# Patient Record
Sex: FEMALE | Race: OTHER | Employment: FULL TIME | URBAN - METROPOLITAN AREA
[De-identification: names, ages, dates, MRNs, and addresses within clinical notes are randomized per-mention and may not be internally consistent; named-entity substitution may affect disease eponyms.]

---

## 2018-06-26 ENCOUNTER — HOSPITAL ENCOUNTER (EMERGENCY)
Age: 28
Discharge: HOME | End: 2018-06-26
Attending: EMERGENCY MEDICINE
Payer: COMMERCIAL

## 2018-06-26 VITALS
SYSTOLIC BLOOD PRESSURE: 120 MMHG | TEMPERATURE: 97.3 F | HEART RATE: 90 BPM | DIASTOLIC BLOOD PRESSURE: 64 MMHG | WEIGHT: 143.52 LBS | RESPIRATION RATE: 15 BRPM | OXYGEN SATURATION: 99 %

## 2018-06-26 DIAGNOSIS — R09.82 POST-NASAL DRIP: ICD-10-CM

## 2018-06-26 DIAGNOSIS — Z34.92 PREGNANT AND NOT YET DELIVERED IN SECOND TRIMESTER: ICD-10-CM

## 2018-06-26 DIAGNOSIS — J30.9 ALLERGIC RHINITIS, UNSPECIFIED SEASONALITY, UNSPECIFIED TRIGGER: Primary | ICD-10-CM

## 2018-06-26 DIAGNOSIS — H92.01 RIGHT EAR PAIN: ICD-10-CM

## 2018-06-26 LAB
APPEARANCE UR: CLEAR
BACTERIA URNS QL MICRO: NEGATIVE /HPF
BILIRUB UR QL: NEGATIVE
COLOR UR: ABNORMAL
EPITH CASTS URNS QL MICRO: ABNORMAL /LPF
GLUCOSE UR STRIP.AUTO-MCNC: NEGATIVE MG/DL
HGB UR QL STRIP: NEGATIVE
HYALINE CASTS URNS QL MICRO: ABNORMAL /LPF (ref 0–5)
KETONES UR QL STRIP.AUTO: NEGATIVE MG/DL
LEUKOCYTE ESTERASE UR QL STRIP.AUTO: ABNORMAL
NITRITE UR QL STRIP.AUTO: NEGATIVE
PH UR STRIP: 6.5 [PH] (ref 5–8)
PROT UR STRIP-MCNC: NEGATIVE MG/DL
RBC #/AREA URNS HPF: ABNORMAL /HPF (ref 0–5)
SP GR UR REFRACTOMETRY: 1.01 (ref 1–1.03)
UR CULT HOLD, URHOLD: NORMAL
UROBILINOGEN UR QL STRIP.AUTO: 0.2 EU/DL (ref 0.2–1)
WBC URNS QL MICRO: ABNORMAL /HPF (ref 0–4)

## 2018-06-26 PROCEDURE — 99284 EMERGENCY DEPT VISIT MOD MDM: CPT

## 2018-06-26 PROCEDURE — 81001 URINALYSIS AUTO W/SCOPE: CPT | Performed by: PHYSICIAN ASSISTANT

## 2018-06-26 PROCEDURE — 87086 URINE CULTURE/COLONY COUNT: CPT | Performed by: PHYSICIAN ASSISTANT

## 2018-06-26 RX ORDER — CETIRIZINE HCL 10 MG
10 TABLET ORAL DAILY
Qty: 20 TAB | Refills: 0 | Status: SHIPPED | OUTPATIENT
Start: 2018-06-26

## 2018-06-26 NOTE — DISCHARGE INSTRUCTIONS
Alergias: Instrucciones de cuidado - [ Allergies: Care Instructions ]  Instrucciones de cuidado    Las alergias ocurren cuando el sistema de defensa del organismo (sistema inmunitario) reacciona de manera excesiva ante ciertas sustancias. El sistema inmunitario trata a cornelio sustancia inofensiva lukas si fuera un microbio perjudicial o un virus. Existen muchas sustancias que pueden provocar esta reacción excesiva, incluidos el polen, los medicamentos, los alimentos, el polvo, la caspa de los [place] y el moho. Las [employer] pueden ser leves o graves. Las alergias leves pueden manejarse en el hogar. Sin embargo, es posible que necesite daina medicamentos para prevenir problemas. Manejar las alergias es cornelio parte importante del estar saludable. Dumas médico podría sugerirle que se ban cornelio prueba de [place] para ayudar a encontrar la causa de las [employer]. Sukhwinder Geovani que sepa cuáles son las cosas que [place] síntomas, puede evitarlas. [place] puede prevenir los síntomas de [place] y [de-identified] de [facility]. Para las [employer] graves que provocan reacciones que afectan a todo dumas organismo (reacciones anafilácticas), dumas médico podría recetarle cornelio inyección de epinefrina para que la lleve con usted en valentine de [place] reacción grave. Aprenda a aplicarse la dosis usted mismo y llévela consigo todo el [place]. Asegúrese de que no haya caducado. La atención de seguimiento es cornelio parte clave de dumas tratamiento y seguridad. Asegúrese de hacer y acudir a todas las citas, y llame a dumas médico si está teniendo problemas. También es cornelio buena idea saber los resultados de los exámenes y mantener cornelio lista de los medicamentos que haylie. ¿Cómo puede cuidarse en el hogar? · Si dumas médico le ha [employer] ácaros del polvo o el polvo es lo que causa dumas alergia, reduzca la cantidad de polvo alrededor de dumas cama:  ¨ Lave las sábanas, las fundas de las almohadas y demás ropa de cama con [place] todas las semanas.   Marychuy Roberts fundas para almohadas, edredones y colchones a prueba de polvo. Evite las fundas de plástico, ya que tienden a romperse fácilmente y no \"respiran\". Lave la ropa de cama siguiendo las instrucciones de la etiqueta. ¨ No use mantas ni almohadas que no necesite. ¨ Use mantas que pueda raegan en la lavadora. [address] campbell y las alfombras de dumas habitación, ya que atraen y acumulan polvo. · Si usted es alérgico al polvo del hogar y a los [place], no use humidificadores. El médico puede sugerirle maneras de controlar el polvo y [place]. · Busque rastros de cucarachas. Las cucarachas provocan reacciones alérgicas. Use cebos para cucarachas para eliminarlas. Luego, limpie jess dumas casa. A las cucarachas les [employer] lugares donde se almacenan bolsas del machado, periódicos, botellas vacías o cory de cartón. No guarde estos objetos dentro de la casa, y mantenga el contenedor de basura y los recipientes de alimentos jess cerrados. Selle todos los lugares por donde las cucarachas puedan ingresar a dumas hogar. · Si usted es alérgico al moho, deshágase de muebles, tapetes y campbell que huelan a moho. Revise que no haya moho en el baño. · Si usted es alérgico al polen del exterior o a las esporas de moho, use el aire acondicionado. Cambie o limpie todos los filtros cornelio vez al [facility]. [facility]. · Si usted es alérgico al polen, no salga cuando la concentración de polen sea tania. Use cornelio aspiradora con filtro HEPA o filtro de doble espesor al [employer] a la [place]. · No salga cuando la contaminación del aire sea tania. Evite los vapores de la pintura, los perfumes y otros olores hedy. · Evite todo lo que pueda empeorar alexus alergias. Manténgase alejado del humo. No fume ni deje que otras personas lo keke en dumas casa. No use chimeneas ni estufas de leña. · Si usted es alérgico a alexus mascotas, cambie el filtro de aire de la calefacción todos los [place].  Use filtros de alto rendimiento. · Si usted es alérgico a la caspa de los [place], no deje que las mascotas entren a la casa o manténgalas fuera de dumas habitación. Las alfombras Ruby y los muebles tapizados con lubna pueden albergar gran cantidad de caspa de animales. Carmine vez tenga que reemplazarlos. ¿Cuándo debe pedir ayuda? Aplíquese cornelio inyección de epinefrina si:  ? · Piensa que está teniendo Marcha Marcelino reacción alérgica grave. ? · Tiene síntomas en más de cornelio leroy del cuerpo, lukas náuseas leves y comezón en la boca. ? Después de aplicarse cornelio inyección de epinefrina, llame al 911 incluso si se siente mejor. ?Llame al 911 si:  ? · Tiene síntomas de cornelio reacción alérgica grave. Estos pueden incluir:  ¨ Zonas abultadas y enrojecidas (ronchas) que aparecen repentinamente por todo el cuerpo. ¨ Hinchazón de la garganta, la boca, los labios o la [place]. ¨ Dificultad para respirar. ¨ Pérdida del conocimiento (desmayo). O podría sentirse muy aturdido o de repente sentirse débil, confuso o agitado. ? · Le kelley aplicado cornelio inyección de epinefrina, incluso si se siente mejor. ?Llame a dumas médico ahora mismo o busque atención médica inmediata si:  ? · Tiene síntomas de cornelio reacción alérgica, tales lukas:  ¨ Salpullido o ronchas (zonas abultadas y enrojecidas en la piel). ¨ Comezón. ¨ Hinchazón. ¨ Dolor abdominal, náuseas o vómito. ?Preste especial atención a los cambios en dumas riky y asegúrese de comunicarse con dumas médico si:  ? · No mejora lukas se esperaba. ¿Dónde puede encontrar más información en inglés? Gasper Rowe a http://silvestre-pepito.info/. Werner Moreno E615 en la búsqueda para aprender más acerca de \"Alergias: Instrucciones de cuidado - [ Allergies: Care Instructions ]. \"  Revisado: 29 septiembre, 2016  Versión del contenido: 11.4  © 8973-9049 Healthwise, [employer]. Las instrucciones de cuidado fueron adaptadas bajo licencia por [employer] (which disclaims liability or warranty for this information). Si usted tiene [place] afección médica o sobre estas instrucciones, siempre pregunte a dumas profesional de riky. [employer] niega toda garantía o responsabilidad por dumas uso de esta información. Dolor de oído: Instrucciones de cuidado - [ Earache: Care Instructions ]  Instrucciones de cuidado    Aunque las infecciones del oído son Gwen Keviney causa común de dolor de oído, no todos los maicol de oído indican cornelio infección. Si usted tiene dolor de oído y no tiene Gwen Cookey infección, podría deberse a un problema de la mandíbula, lukas el dolor de la articulación temporomandibular (TMJ, por alexus siglas en inglés). O podría ser por un problema en el francy. Cuando la molestia o el dolor son leves o aparecen y desaparecen sin otro síntoma, es posible que todo lo que necesite sea tratamiento en el hogar. La atención de seguimiento es cornelio parte clave de dumas tratamiento y seguridad. Asegúrese de hacer y acudir a todas las citas, y llame a dumas médico si está teniendo problemas. También es cornelio buena idea saber los resultados de los exámenes y mantener cornelio lista de los medicamentos que haylie. ¿Cómo puede cuidarse en el [facility]? · Aplíquese calor en el oído para aliviar el dolor. Para aplicarse calor, póngase cornelio bolsa de agua tibia, cornelio almohadilla térmica ajustada a baja temperatura o un paño tibio sobre la [place]. No se vaya a dormir con cornelio almohadilla térmica sobre la piel. · [place] un analgésico (medicamento para el dolor) de venta ranjith, lukas acetaminofén (Tylenol), ibuprofeno (Advil, Motrin) o naproxeno (Aleve). Tenga cuidado con los medicamentos. Isabel y siga todas las instrucciones de la [place]. · No tome dos o más analgésicos al mismo tiempo a menos que el médico se lo haya indicado. Muchos analgésicos contienen acetaminofén, lo cual es Tylenol. El exceso de acetaminofén (Tylenol) puede ser dañino.   · Nunca se introduzca nada en el oído, lukas, por ejemplo, un hisopo de algodón o un prendedor (pasador) de pelo. ¿Cuándo debe pedir ayuda? Llame a dumas médico ahora mismo o busque atención médica inmediata si:  ? · Tiene nuevos o peores síntomas de infección, tales lukas:  ¨ Aumento del dolor, la hinchazón, la temperatura o el enrojecimiento. ¨ Vetas rojizas que salen de la leroy. ¨ Pus que sale de la leroy. Donny Levi. ?Preste especial atención a los cambios en dumas riky y asegúrese de comunicarse con dumas médico si:  ? · Tiene secreción nueva o peor que sale del oído. ? · No mejora lukas se esperaba. ¿Dónde puede encontrar más información en inglés? Calderon Falcon a http://silvestre-pepito.info/. Shane London O115 en la búsqueda para aprender más acerca de \"Dolor de oído: Instrucciones de cuidado - [ Earache: Care Instructions ]. \"  Revisado: 12 rolle, 2017  Versión del contenido: 11.4  © 7183-7974 Healthwise, Incorporated. Las instrucciones de cuidado fueron adaptadas bajo licencia por [employer] (which disclaims liability or warranty for this information). Si usted tiene [place] afección médica o sobre estas instrucciones, siempre pregunte a dumas profesional de riky. Healthwise, Incorporated niega toda garantía o responsabilidad por dumas uso de esta información. We hope that we have addressed all of your medical concerns. The examination and treatment you received in the Emergency Department were for an emergent problem and were not intended as complete care. It is important that you follow up with your healthcare provider(s) for ongoing care. If your symptoms worsen or do not improve as expected, and you are unable to reach your usual health care provider(s), you should return to the Emergency Department. Today's healthcare is undergoing tremendous change, and patient satisfaction surveys are one of the many tools to assess the quality of medical care. You may receive a survey from the [employer] organization regarding your experience in the Emergency Department.   I hope that your experience has been completely positive, particularly the medical care that I provided. As such, please participate in the survey; anything less than excellent does not meet my expectations or intentions. [facility] and [facility] participate in nationally recognized quality of care measures. If your blood pressure is greater than 120/80, as reported below, we urge that you seek medical care to address the potential of high blood pressure, commonly known as hypertension. Hypertension can be hereditary or can be caused by certain medical conditions, pain, stress, or \"white coat syndrome. \"       Please make an appointment with your health care provider(s) for follow up of your Emergency Department visit. VITALS:   Patient Vitals for the past 8 hrs:   Temp Pulse Resp BP SpO2   06/26/18 1248 97.3 °F (36.3 °C) 90 15 120/64 99 %          Thank you for allowing us to provide you with medical care today. We realize that you have many choices for your emergency care needs. Please choose us in the future for any continued health care needs. Jefferson Coto, [facility]: [phone]            Recent Results (from the past 24 hour(s))   URINALYSIS W/MICROSCOPIC    Collection Time: 06/26/18  1:18 PM   Result Value Ref Range    Color YELLOW/STRAW      Appearance CLEAR CLEAR      Specific gravity 1.006 1.003 - 1.030      pH (UA) 6.5 5.0 - 8.0      Protein NEGATIVE  NEG mg/dL    Glucose NEGATIVE  NEG mg/dL    Ketone NEGATIVE  NEG mg/dL    Bilirubin NEGATIVE  NEG      Blood NEGATIVE  NEG      Urobilinogen 0.2 0.2 - 1.0 EU/dL    Nitrites NEGATIVE  NEG      Leukocyte Esterase SMALL (A) NEG      WBC 5-10 0 - 4 /hpf    RBC 0-5 0 - 5 /hpf    Epithelial cells FEW FEW /lpf    Bacteria NEGATIVE  NEG /hpf    Hyaline cast 0-2 0 - 5 /lpf   URINE CULTURE HOLD SAMPLE    Collection Time: 06/26/18  1:18 PM   Result Value Ref Range    Urine culture hold        URINE ON HOLD IN MICROBIOLOGY DEPT FOR 3 DAYS. IF UNPRESERVED URINE IS SUBMITTED, IT CANNOT BE USED FOR ADDITIONAL TESTING AFTER 24 HRS, RECOLLECTION WILL BE REQUIRED. No results found. Lavados nasales salinos: Instrucciones de cuidado - [ Saline Nasal Washes: Care Instructions ]  Instrucciones de cuidado  Los lavados nasales salinos ayudan a mantener las fosas nasales abiertas al eliminar la mucosidad espesa o seca. Mary sencillo remedio puede ayudar a [employer] síntomas de [place], sinusitis y resfriados. También puede hacer que la nariz se sienta más cómoda al [employer] las membranas mucosas húmedas. Es posible que note cornelio ligera sensación de ardor en la nariz las primeras veces que use la solución, dakota esto por lo general mejora en unos cuantos días. La atención de seguimiento es cornelio parte clave de dumas tratamiento y seguridad. Asegúrese de hacer y acudir a todas las citas, y llame a dumas médico si está teniendo problemas. También es cornelio buena idea saber los resultados de los exámenes y mantener cornelio lista de los medicamentos que haylie. ¿Cómo puede cuidarse en el hogar? · Puede comprar en la farmacia cornelio solución salina lista para usar en cornelio botella de apretar u otros productos de lavado nasal salino. Isabel y siga las instrucciones de la etiqueta. · También puede preparar dumas propia solución salina agregándole 1 cucharadita de sal y 1 cucharadita de bicarbonato de sodio a 2 tazas de agua destilada. · Si [place] cornelio solución hecha en casa, eche un poco en un tazón limpio. Utilizando cornelio preethi de goma, comprima la preethi e introduzca la boquilla en el agua salada. Recoja cornelio pequeña cantidad de agua salada en la preethi aflojando la mano. · Siéntese con la schuyler inclinada un poco hacia atrás. No se recueste del todo. Introduzca un poco la boquilla de la preethi de goma o de la botella de apretar en cornelio de las fosas nasales.  Eche suavemente unas cuantas gotas o un pequeño chorro en cornelio de las fosas nasales. Repita la operación en la otra fosa nasal. Es normal tener unos cuantos estornudos y arcadas al principio. · Suénese la nariz con cuidado. · Limpie la preethi de goma o la boquilla de la botella después de [address]. · Soila esto 2 o 3 veces al día. · Hágase el lavado nasal con cuidado si le sangra la nariz con frecuencia. ¿Cuándo debe pedir ayuda? Preste especial atención a los cambios en dumas riky y asegúrese de comunicarse con dumas médico si:  ? · Tiene hemorragias nasales frecuentes. ? · Tiene problemas para hacerse los lavados nasales. ¿Dónde puede encontrar más información en inglés? John Dominique a http://silvestre-pepito.info/. Escriba B784 en la búsqueda para aprender más acerca de \"Lavados nasales salinos: Instrucciones de cuidado - [ Saline Nasal Washes: Care Instructions ]. \"  Revisado: 12 [place], 2017  Versión del contenido: 11.4  © 5047-2600 Healthwise, Incorporated. Las instrucciones de cuidado fueron adaptadas bajo licencia por [employer] (which disclaims liability or warranty for this information). Si usted tiene [place] afección médica o sobre estas instrucciones, siempre pregunte a dumas profesional de riky. Healthwise, Incorporated niega toda garantía o responsabilidad por dumas uso de esta información.

## 2018-06-26 NOTE — ED PROVIDER NOTES
HPI Comments: Mirna Juarez is a 32 y.o. female who presents ambulatory to the ED with a c/o cold sx x 4 days with sore throat, worse at night, right ear ache, non productive cough, and nasal congestion. She denies f/c or tx pta. Pt is 6 mo pregnant and followed at [facility]. She states her last prenatal visit a few weeks ago was without incident and the baby is doing well. She notes when she coughs, she feels lower pelvic pressure. Pt denies dysuria, hematuria or vaginal bleeding. Pt notes no abd discomfort otherwise and states the baby is active. She denies cp, sob or n/v/d. Pt is currently taking macrobid for uti that she filled 6/18/18    PCP: None  PMHx significant for: No past medical history on file. PSHx significant for: No past surgical history on file. Social Hx: Tobacco: denies. EtOH: denies  Illicit drug use: denies     There are no further complaints or symptoms at this time. The history is provided by the patient. No past medical history on file. No past surgical history on file. No family history on file. Social History     Social History    Marital status:      Spouse name: N/A    Number of children: N/A    Years of education: N/A     Occupational History    Not on file. Social History Main Topics    Smoking status: Not on file    Smokeless tobacco: Not on file    Alcohol use Not on file    Drug use: Not on file    Sexual activity: Not on file     Other Topics Concern    Not on file     Social History Narrative         ALLERGIES: Review of patient's allergies indicates no known allergies. Review of Systems   Constitutional: Negative for chills and fever. HENT: Positive for congestion, ear pain, postnasal drip, rhinorrhea and sore throat. Negative for sneezing. Eyes: Negative for redness and visual disturbance. Respiratory: Positive for cough. Negative for shortness of breath. Cardiovascular: Negative for chest pain and leg swelling. Gastrointestinal: Negative for abdominal pain, nausea and vomiting. Genitourinary: Negative for difficulty urinating and frequency. Musculoskeletal: Negative for back pain, myalgias and neck stiffness. Skin: Negative for rash. Neurological: Negative for dizziness, syncope, weakness and headaches. Hematological: Negative for adenopathy. Vitals:    06/26/18 1248   BP: 120/64   Pulse: 90   Resp: 15   Temp: 97.3 °F (36.3 °C)   SpO2: 99%   Weight: 65.1 kg (143 lb 8.3 oz)            Physical Exam   Constitutional: She is oriented to person, place, and time. She appears well-developed and well-nourished. No distress. HENT:   Head: Normocephalic and atraumatic. Right Ear: External ear normal.   Left Ear: External ear normal.   + pale boggy nares with clear rhinorrhea. + PND. tms pearly bilaterally without erythema, bulging or drainage. Oropharynx without erythema, exudate or swelling. Uvula midline. No trismus. No difficulty handling secretions or speaking. No sublingual swelling   Eyes: EOM are normal. Pupils are equal, round, and reactive to light. Neck: Neck supple. Cardiovascular: Normal rate, regular rhythm, normal heart sounds and intact distal pulses. Exam reveals no gallop and no friction rub. No murmur heard. Pulmonary/Chest: Effort normal and breath sounds normal. No stridor. No respiratory distress. She has no wheezes. She has no rales. She exhibits no tenderness. Abdominal: Soft. Bowel sounds are normal. She exhibits no distension and no mass. There is no tenderness. There is no rebound and no guarding. Gravid uterus above the umbilicus   Musculoskeletal: Normal range of motion. She exhibits no edema, tenderness or deformity. Neurological: She is alert and oriented to person, place, and time. No cranial nerve deficit. Coordination normal.   Skin: No rash noted. No erythema. No pallor. Psychiatric: She has a normal mood and affect.  Her behavior is normal.   Nursing note and vitals reviewed. MDM  Number of Diagnoses or Management Options  Allergic rhinitis, unspecified seasonality, unspecified trigger:   Post-nasal drip:   Pregnant and not yet delivered in second trimester:   Right ear pain:      Amount and/or Complexity of Data Reviewed  Clinical lab tests: reviewed and ordered  Tests in the medicine section of CPT®: ordered and reviewed  Review and summarize past medical records: yes  Independent visualization of images, tracings, or specimens: yes    Patient Progress  Patient progress: stable        ED Course       Procedures  12:58 PM  Discussed pt, sx, hx and current findings with Ramila Hernandez MD. She is in agreement with plan. Will check urine and FHT. Vivian Aguilar. CALLY Coto      1:47 PM   Urine with small leuk and 5-10 wbc without bacteria. Pt asymptomatic, but 6 mo preg. Will send for culture. FHT strong/ regular  Vivian Aguilar. CALLY Coto    LABORATORY TESTS:  Recent Results (from the past 12 hour(s))   URINALYSIS W/MICROSCOPIC    Collection Time: 06/26/18  1:18 PM   Result Value Ref Range    Color YELLOW/STRAW      Appearance CLEAR CLEAR      Specific gravity 1.006 1.003 - 1.030      pH (UA) 6.5 5.0 - 8.0      Protein NEGATIVE  NEG mg/dL    Glucose NEGATIVE  NEG mg/dL    Ketone NEGATIVE  NEG mg/dL    Bilirubin NEGATIVE  NEG      Blood NEGATIVE  NEG      Urobilinogen 0.2 0.2 - 1.0 EU/dL    Nitrites NEGATIVE  NEG      Leukocyte Esterase SMALL (A) NEG      WBC 5-10 0 - 4 /hpf    RBC 0-5 0 - 5 /hpf    Epithelial cells FEW FEW /lpf    Bacteria NEGATIVE  NEG /hpf    Hyaline cast 0-2 0 - 5 /lpf   URINE CULTURE HOLD SAMPLE    Collection Time: 06/26/18  1:18 PM   Result Value Ref Range    Urine culture hold        URINE ON HOLD IN MICROBIOLOGY DEPT FOR 3 DAYS. IF UNPRESERVED URINE IS SUBMITTED, IT CANNOT BE USED FOR ADDITIONAL TESTING AFTER 24 HRS, RECOLLECTION WILL BE REQUIRED. IMAGING RESULTS:    No results found.     MEDICATIONS GIVEN:  Medications - No data to display    IMPRESSION:  1. Allergic rhinitis, unspecified seasonality, unspecified trigger    2. Right ear pain    3. Post-nasal drip    4. Pregnant and not yet delivered in second trimester        PLAN:  1. Discharge Medication List as of 6/26/2018  1:47 PM      START taking these medications    Details   cetirizine (ZYRTEC) 10 mg tablet Take 1 Tab by mouth daily. , Print, Disp-20 Tab, R-0           2. Follow-up Information     Follow up With Details Comments Contact Info    [facility] DEPARTMENT OF OB/GYN Schedule an appointment as soon as possible for a visit 2-4 days for recheck [address] [address]  [phone]        Return to ED if worse       1:48 PM  Pt has been reexamined. Pt has no new complaints, changes or physical findings. Care plan outlined and precautions discussed. All available results were reviewed with pt. All medications were reviewed with pt. All of pt's questions and concerns were addressed. Pt agrees to F/U as instructed and agrees to return to ED upon further deterioration. Pt is ready to go home.   KENISHA Chapman

## 2018-06-26 NOTE — LETTER
[address] 
[facility] EMERGENCY DEPT 
[address] [facility] [address] [id number] 
[phone] Work/School Note Date: 6/26/2018 To Whom It May concern: 
 
Mirna Juarez was seen and treated today in the emergency room by the following provider(s): 
Attending Provider: Soraida Castellon MD 
Physician Assistant: KENISHA Hightower. Mirna Juarez may return to work on 6/28/18.  
 
Sincerely, 
 
 
 
 
KENISHA Hightower

## 2018-06-26 NOTE — ED TRIAGE NOTES
Patient with sore throat and right ear pain for 4 days. Pt is ~ 6 months pregnant, denies pregnancy complications.

## 2018-06-27 LAB
BACTERIA SPEC CULT: ABNORMAL
CC UR VC: ABNORMAL
SERVICE CMNT-IMP: ABNORMAL

## 2019-09-17 ENCOUNTER — HOSPITAL ENCOUNTER (OUTPATIENT)
Dept: LAB | Age: 29
Discharge: HOME | End: 2019-09-17

## 2019-09-17 PROCEDURE — 87491 CHLMYD TRACH DNA AMP PROBE: CPT

## 2019-09-18 LAB
C TRACH DNA SPEC QL NAA+PROBE: NEGATIVE
N GONORRHOEA DNA SPEC QL NAA+PROBE: NEGATIVE
SAMPLE TYPE: NORMAL
SERVICE CMNT-IMP: NORMAL
SPECIMEN SOURCE: NORMAL

## 2023-05-18 ENCOUNTER — OFFICE VISIT (OUTPATIENT)
Age: 33
End: 2023-05-18

## 2023-05-18 VITALS
BODY MASS INDEX: 26.62 KG/M2 | HEIGHT: 60 IN | DIASTOLIC BLOOD PRESSURE: 74 MMHG | SYSTOLIC BLOOD PRESSURE: 115 MMHG | HEART RATE: 91 BPM | OXYGEN SATURATION: 97 % | WEIGHT: 135.6 LBS | RESPIRATION RATE: 16 BRPM | TEMPERATURE: 97.5 F

## 2023-05-18 DIAGNOSIS — E55.9 VITAMIN D DEFICIENCY: ICD-10-CM

## 2023-05-18 DIAGNOSIS — E78.2 MIXED HYPERLIPIDEMIA: ICD-10-CM

## 2023-05-18 DIAGNOSIS — K21.9 GASTROESOPHAGEAL REFLUX DISEASE WITHOUT ESOPHAGITIS: ICD-10-CM

## 2023-05-18 DIAGNOSIS — N30.00 ACUTE CYSTITIS WITHOUT HEMATURIA: Primary | ICD-10-CM

## 2023-05-18 LAB
BILIRUBIN, URINE, POC: NEGATIVE
BLOOD URINE, POC: NORMAL
GLUCOSE URINE, POC: NEGATIVE
KETONES, URINE, POC: NEGATIVE
LEUKOCYTE ESTERASE, URINE, POC: NORMAL
NITRITE, URINE, POC: NEGATIVE
PH, URINE, POC: 6 (ref 4.6–8)
PROTEIN,URINE, POC: NEGATIVE
SPECIFIC GRAVITY, URINE, POC: 1 (ref 1–1.03)
URINALYSIS CLARITY, POC: CLEAR
URINALYSIS COLOR, POC: YELLOW
UROBILINOGEN, POC: NORMAL

## 2023-05-18 PROCEDURE — 99204 OFFICE O/P NEW MOD 45 MIN: CPT | Performed by: NURSE PRACTITIONER

## 2023-05-18 PROCEDURE — 81003 URINALYSIS AUTO W/O SCOPE: CPT | Performed by: NURSE PRACTITIONER

## 2023-05-18 RX ORDER — PANTOPRAZOLE SODIUM 40 MG/1
40 TABLET, DELAYED RELEASE ORAL DAILY
COMMUNITY

## 2023-05-18 RX ORDER — NITROFURANTOIN 25; 75 MG/1; MG/1
100 CAPSULE ORAL 2 TIMES DAILY
Qty: 14 CAPSULE | Refills: 0 | Status: SHIPPED | OUTPATIENT
Start: 2023-05-18 | End: 2023-05-25

## 2023-05-18 RX ORDER — DM/P-EPHED/ACETAMINOPH/DOXYLAM
LIQUID (ML) ORAL
COMMUNITY
Start: 2023-04-25

## 2023-05-18 SDOH — ECONOMIC STABILITY: INCOME INSECURITY: HOW HARD IS IT FOR YOU TO PAY FOR THE VERY BASICS LIKE FOOD, HOUSING, MEDICAL CARE, AND HEATING?: NOT VERY HARD

## 2023-05-18 SDOH — ECONOMIC STABILITY: FOOD INSECURITY: WITHIN THE PAST 12 MONTHS, THE FOOD YOU BOUGHT JUST DIDN'T LAST AND YOU DIDN'T HAVE MONEY TO GET MORE.: NEVER TRUE

## 2023-05-18 SDOH — ECONOMIC STABILITY: HOUSING INSECURITY
IN THE LAST 12 MONTHS, WAS THERE A TIME WHEN YOU DID NOT HAVE A STEADY PLACE TO SLEEP OR SLEPT IN A SHELTER (INCLUDING NOW)?: NO

## 2023-05-18 SDOH — ECONOMIC STABILITY: FOOD INSECURITY: WITHIN THE PAST 12 MONTHS, YOU WORRIED THAT YOUR FOOD WOULD RUN OUT BEFORE YOU GOT MONEY TO BUY MORE.: NEVER TRUE

## 2023-05-18 ASSESSMENT — ENCOUNTER SYMPTOMS
COUGH: 0
NAUSEA: 0
EYE REDNESS: 0
SHORTNESS OF BREATH: 0
VOMITING: 0
RHINORRHEA: 0
ABDOMINAL PAIN: 0
CONSTIPATION: 0
EYE PAIN: 0
EYES NEGATIVE: 1
SINUS PRESSURE: 0
BLOOD IN STOOL: 0
SINUS PAIN: 0
BACK PAIN: 0
DIARRHEA: 0
CHEST TIGHTNESS: 0
RESPIRATORY NEGATIVE: 1
GASTROINTESTINAL NEGATIVE: 1

## 2023-05-18 ASSESSMENT — PATIENT HEALTH QUESTIONNAIRE - PHQ9
SUM OF ALL RESPONSES TO PHQ QUESTIONS 1-9: 0
1. LITTLE INTEREST OR PLEASURE IN DOING THINGS: 0
2. FEELING DOWN, DEPRESSED OR HOPELESS: 0
SUM OF ALL RESPONSES TO PHQ9 QUESTIONS 1 & 2: 0
SUM OF ALL RESPONSES TO PHQ QUESTIONS 1-9: 0

## 2023-05-18 NOTE — PROGRESS NOTES
Assessment and Plan     1. Acute cystitis without hematuria: Urinalysis results discussed, will send sample for culture. Treatment for UTI started, medication mode of use and possible side effects discussed. Hydration recommended. Pt verbalized understanding.   -     AMB POC URINALYSIS DIP STICK AUTO W/O MICRO  -     Culture, Urine; Future  -     nitrofurantoin, macrocrystal-monohydrate, (MACROBID) 100 MG capsule; Take 1 capsule by mouth 2 times daily for 7 days, Disp-14 capsule, R-0Normal  2. Vitamin D deficiency: Continue with same treatment. Will check labs today  -     Vitamin D 25 Hydroxy; Future  3. Mixed hyperlipidemia:  Diet-controlled. Lab work ordered. -     Lipid Panel; Future  -     CBC with Auto Differential; Future  -     Comprehensive Metabolic Panel; Future  4. Gastroesophageal reflux disease without esophagitis: Pt to avoid triggers, PPI as needed only as symptoms are sporadic.   -     Comprehensive Metabolic Panel; Future     Tests Preformed During Visit:    Results for orders placed or performed in visit on 05/18/23   AMB POC URINALYSIS DIP STICK AUTO W/O MICRO   Result Value Ref Range    Color, Urine, POC Yellow     Clarity, Urine, POC Clear     Glucose, Urine, POC Negative Negative    Bilirubin, Urine, POC Negative Negative    Ketones, Urine, POC Negative Negative    Specific Gravity, Urine, POC 1.005 1.001 - 1.035    Blood, Urine, POC 1+ Negative    pH, Urine, POC 6 4.6 - 8.0    Protein, Urine, POC Negative Negative    Urobilinogen, POC 0.2 mg/dL     Nitrite, Urine, POC Negative Negative    Leukocyte Esterase, Urine, POC Trace Negative      Benefits, risks, possible drug interactions, and side effects of all new medications were reviewed with the patient. Pt verbalized understanding. An electronic signature was used to authenticate this note. MARSHALL Bourne - CNP  5/18/2023    Follow-up and Dispositions    Return in about 4 weeks (around 6/15/2023).         History of

## 2023-05-19 LAB
25(OH)D3 SERPL-MCNC: 31.3 NG/ML (ref 30–100)
ALBUMIN SERPL-MCNC: 4 G/DL (ref 3.5–5)
ALBUMIN/GLOB SERPL: 1.2 (ref 1.1–2.2)
ALP SERPL-CCNC: 70 U/L (ref 45–117)
ALT SERPL-CCNC: 18 U/L (ref 12–78)
ANION GAP SERPL CALC-SCNC: 5 MMOL/L (ref 5–15)
AST SERPL-CCNC: 14 U/L (ref 15–37)
BASOPHILS # BLD: 0.1 K/UL (ref 0–0.1)
BASOPHILS NFR BLD: 1 % (ref 0–1)
BILIRUB SERPL-MCNC: 0.4 MG/DL (ref 0.2–1)
BUN SERPL-MCNC: 12 MG/DL (ref 6–20)
BUN/CREAT SERPL: 14 (ref 12–20)
CALCIUM SERPL-MCNC: 9.2 MG/DL (ref 8.5–10.1)
CHLORIDE SERPL-SCNC: 107 MMOL/L (ref 97–108)
CHOLEST SERPL-MCNC: 183 MG/DL
CO2 SERPL-SCNC: 28 MMOL/L (ref 21–32)
CREAT SERPL-MCNC: 0.84 MG/DL (ref 0.55–1.02)
DIFFERENTIAL METHOD BLD: ABNORMAL
EOSINOPHIL # BLD: 0 K/UL (ref 0–0.4)
EOSINOPHIL NFR BLD: 0 % (ref 0–7)
ERYTHROCYTE [DISTWIDTH] IN BLOOD BY AUTOMATED COUNT: 12 % (ref 11.5–14.5)
GLOBULIN SER CALC-MCNC: 3.3 G/DL (ref 2–4)
GLUCOSE SERPL-MCNC: 91 MG/DL (ref 65–100)
HCT VFR BLD AUTO: 41.7 % (ref 35–47)
HDLC SERPL-MCNC: 54 MG/DL
HDLC SERPL: 3.4 (ref 0–5)
HGB BLD-MCNC: 13.6 G/DL (ref 11.5–16)
IMM GRANULOCYTES # BLD AUTO: 0 K/UL (ref 0–0.04)
IMM GRANULOCYTES NFR BLD AUTO: 0 % (ref 0–0.5)
LDLC SERPL CALC-MCNC: 98.2 MG/DL (ref 0–100)
LYMPHOCYTES # BLD: 2 K/UL (ref 0.8–3.5)
LYMPHOCYTES NFR BLD: 24 % (ref 12–49)
MCH RBC QN AUTO: 29.9 PG (ref 26–34)
MCHC RBC AUTO-ENTMCNC: 32.6 G/DL (ref 30–36.5)
MCV RBC AUTO: 91.6 FL (ref 80–99)
MONOCYTES # BLD: 0.4 K/UL (ref 0–1)
MONOCYTES NFR BLD: 4 % (ref 5–13)
NEUTS SEG # BLD: 5.7 K/UL (ref 1.8–8)
NEUTS SEG NFR BLD: 71 % (ref 32–75)
NRBC # BLD: 0 K/UL (ref 0–0.01)
NRBC BLD-RTO: 0 PER 100 WBC
PLATELET # BLD AUTO: 283 K/UL (ref 150–400)
PMV BLD AUTO: 10.2 FL (ref 8.9–12.9)
POTASSIUM SERPL-SCNC: 3.9 MMOL/L (ref 3.5–5.1)
PROT SERPL-MCNC: 7.3 G/DL (ref 6.4–8.2)
RBC # BLD AUTO: 4.55 M/UL (ref 3.8–5.2)
SODIUM SERPL-SCNC: 140 MMOL/L (ref 136–145)
TRIGL SERPL-MCNC: 154 MG/DL
VLDLC SERPL CALC-MCNC: 30.8 MG/DL
WBC # BLD AUTO: 8.2 K/UL (ref 3.6–11)

## 2023-06-19 ENCOUNTER — HOSPITAL ENCOUNTER (OUTPATIENT)
Facility: HOSPITAL | Age: 33
Setting detail: SPECIMEN
Discharge: HOME | End: 2023-06-22
Payer: COMMERCIAL

## 2023-06-19 ENCOUNTER — OFFICE VISIT (OUTPATIENT)
Age: 33
End: 2023-06-19
Payer: COMMERCIAL

## 2023-06-19 VITALS
SYSTOLIC BLOOD PRESSURE: 106 MMHG | DIASTOLIC BLOOD PRESSURE: 64 MMHG | WEIGHT: 136.8 LBS | TEMPERATURE: 98 F | OXYGEN SATURATION: 99 % | HEART RATE: 81 BPM | BODY MASS INDEX: 26.86 KG/M2 | RESPIRATION RATE: 16 BRPM | HEIGHT: 60 IN

## 2023-06-19 DIAGNOSIS — E78.2 MIXED HYPERLIPIDEMIA: ICD-10-CM

## 2023-06-19 DIAGNOSIS — E55.9 VITAMIN D DEFICIENCY: ICD-10-CM

## 2023-06-19 DIAGNOSIS — N76.0 BACTERIAL VAGINOSIS: ICD-10-CM

## 2023-06-19 DIAGNOSIS — Z00.00 ANNUAL PHYSICAL EXAM: Primary | ICD-10-CM

## 2023-06-19 DIAGNOSIS — B96.89 BACTERIAL VAGINOSIS: ICD-10-CM

## 2023-06-19 LAB
HCG, PREGNANCY, URINE, POC: NEGATIVE
VALID INTERNAL CONTROL, POC: YES

## 2023-06-19 PROCEDURE — 88175 CYTOPATH C/V AUTO FLUID REDO: CPT

## 2023-06-19 PROCEDURE — 87624 HPV HI-RISK TYP POOLED RSLT: CPT

## 2023-06-19 PROCEDURE — 99395 PREV VISIT EST AGE 18-39: CPT | Performed by: NURSE PRACTITIONER

## 2023-06-19 PROCEDURE — 81025 URINE PREGNANCY TEST: CPT | Performed by: NURSE PRACTITIONER

## 2023-06-19 RX ORDER — METRONIDAZOLE 500 MG/1
500 TABLET ORAL 2 TIMES DAILY
Qty: 14 TABLET | Refills: 0 | Status: SHIPPED | OUTPATIENT
Start: 2023-06-19 | End: 2023-06-26

## 2023-06-19 RX ORDER — FLUCONAZOLE 150 MG/1
150 TABLET ORAL
Qty: 2 TABLET | Refills: 0 | Status: SHIPPED | OUTPATIENT
Start: 2023-06-19 | End: 2023-06-25

## 2023-06-19 ASSESSMENT — PATIENT HEALTH QUESTIONNAIRE - PHQ9
SUM OF ALL RESPONSES TO PHQ9 QUESTIONS 1 & 2: 0
SUM OF ALL RESPONSES TO PHQ QUESTIONS 1-9: 0
2. FEELING DOWN, DEPRESSED OR HOPELESS: 0
SUM OF ALL RESPONSES TO PHQ QUESTIONS 1-9: 0
SUM OF ALL RESPONSES TO PHQ QUESTIONS 1-9: 0
1. LITTLE INTEREST OR PLEASURE IN DOING THINGS: 0
SUM OF ALL RESPONSES TO PHQ QUESTIONS 1-9: 0

## 2023-06-19 NOTE — PROGRESS NOTES
Keith Perdue is a 27 y.o. female  Presenting for her annual checkup and follow-up. Pt has history of hyperlipidemia, diet-controlled and vitamin D deficiency. Pt takes daily vitamin D with compliance. Pt reports fishy vaginal odor, onset 3 days ago. Denies abnormal vaginal discharge, pelvic pain. Last breast exam:   Last PAP/pelvic:   Last colonoscopy: n/a  Last DEXA: n/a  Health Maintenance   Topic Date Due    COVID-19 Vaccine (1) 1989    Cervical cancer screen  Never done    Flu vaccine (Season Ended) 2023    Depression Screen  2024    DTaP/Tdap/Td vaccine (2 - Td or Tdap) 2028    Hepatitis A vaccine  Aged Out    Hib vaccine  Aged Out    Meningococcal (ACWY) vaccine  Aged Out    Pneumococcal 0-64 years Vaccine  Aged Out    Varicella vaccine  Discontinued    Hepatitis C screen  Discontinued    HIV screen  Discontinued       Exercise: moderately active  Diet: generally follows a low fat low cholesterol diet, exercises regularly, nonsmoker, caffeine intake sporadic, alcohol intake none    Vaccinations reviewed  Immunization History   Administered Date(s) Administered    COVID-19, MODERNA Booster BLUE border, (age 18y+), IM, 50mcg/0.25mL 2021, 2021, 2022       Allergies: Patient has no known allergies. Current Outpatient Medications   Medication Sig    SPRINTEC 28 0.25-35 MG-MCG per tablet     D 5000 125 MCG (5000 UT) CAPS capsule     pantoprazole (PROTONIX) 40 MG tablet Take 1 tablet by mouth daily     No current facility-administered medications for this visit. has a past medical history of Non-smoker.   Past Surgical History:   Procedure Laterality Date     SECTION      2 c-sections ; 2018    [place] UNLISTED PROCEDURE ABDOMEN PERITONEUM & OMENTUM        Social History     Socioeconomic History    Marital status:      Spouse name: Not on file    Number of children: Not on file    Years of education: Not on file    Highest education level:

## 2023-06-22 LAB
A VAGINAE DNA VAG QL NAA+PROBE: ABNORMAL SCORE
BVAB2 DNA VAG QL NAA+PROBE: ABNORMAL SCORE
MEGA1 DNA VAG QL NAA+PROBE: ABNORMAL SCORE
SPECIMEN SOURCE: ABNORMAL

## 2023-11-28 DIAGNOSIS — Z00.00 ANNUAL PHYSICAL EXAM: ICD-10-CM

## 2024-06-27 ENCOUNTER — TELEPHONE (OUTPATIENT)
Age: 34
End: 2024-06-27

## 2024-06-27 RX ORDER — PANTOPRAZOLE SODIUM 40 MG/1
40 TABLET, DELAYED RELEASE ORAL DAILY
Qty: 30 TABLET | Refills: 0 | Status: SHIPPED | OUTPATIENT
Start: 2024-06-27

## 2024-06-27 NOTE — TELEPHONE ENCOUNTER
Medication refill:     pantoprazole (PROTONIX) 40 MG tablet     [facility] [id number] - [place], [place] - [address] - P [phone] - F [phone]  [address] [facility] [address]  Phone: [phone]  Fax: [phone]

## 2024-08-19 ENCOUNTER — OFFICE VISIT (OUTPATIENT)
Age: 34
End: 2024-08-19
Payer: COMMERCIAL

## 2024-08-19 VITALS
DIASTOLIC BLOOD PRESSURE: 60 MMHG | OXYGEN SATURATION: 98 % | HEART RATE: 68 BPM | HEIGHT: 60 IN | TEMPERATURE: 98.6 F | BODY MASS INDEX: 26.35 KG/M2 | RESPIRATION RATE: 16 BRPM | WEIGHT: 134.2 LBS | SYSTOLIC BLOOD PRESSURE: 94 MMHG

## 2024-08-19 DIAGNOSIS — E55.9 VITAMIN D DEFICIENCY: ICD-10-CM

## 2024-08-19 DIAGNOSIS — K21.9 GASTROESOPHAGEAL REFLUX DISEASE WITHOUT ESOPHAGITIS: ICD-10-CM

## 2024-08-19 DIAGNOSIS — Z00.00 ANNUAL PHYSICAL EXAM: Primary | ICD-10-CM

## 2024-08-19 DIAGNOSIS — E78.2 MIXED HYPERLIPIDEMIA: ICD-10-CM

## 2024-08-19 DIAGNOSIS — Z30.09 FAMILY PLANNING: ICD-10-CM

## 2024-08-19 PROCEDURE — 99395 PREV VISIT EST AGE 18-39: CPT | Performed by: NURSE PRACTITIONER

## 2024-08-19 RX ORDER — DM/P-EPHED/ACETAMINOPH/DOXYLAM
5000 LIQUID (ML) ORAL DAILY
Qty: 90 CAPSULE | Refills: 3 | Status: SHIPPED | OUTPATIENT
Start: 2024-08-19 | End: 2025-08-14

## 2024-08-19 RX ORDER — PANTOPRAZOLE SODIUM 40 MG/1
40 TABLET, DELAYED RELEASE ORAL DAILY
Qty: 90 TABLET | Refills: 1 | Status: SHIPPED | OUTPATIENT
Start: 2024-08-19 | End: 2025-02-15

## 2024-08-19 SDOH — ECONOMIC STABILITY: FOOD INSECURITY: WITHIN THE PAST 12 MONTHS, THE FOOD YOU BOUGHT JUST DIDN'T LAST AND YOU DIDN'T HAVE MONEY TO GET MORE.: NEVER TRUE

## 2024-08-19 SDOH — ECONOMIC STABILITY: FOOD INSECURITY: WITHIN THE PAST 12 MONTHS, YOU WORRIED THAT YOUR FOOD WOULD RUN OUT BEFORE YOU GOT MONEY TO BUY MORE.: NEVER TRUE

## 2024-08-19 SDOH — ECONOMIC STABILITY: INCOME INSECURITY: HOW HARD IS IT FOR YOU TO PAY FOR THE VERY BASICS LIKE FOOD, HOUSING, MEDICAL CARE, AND HEATING?: NOT HARD AT ALL

## 2024-08-19 ASSESSMENT — PATIENT HEALTH QUESTIONNAIRE - PHQ9
SUM OF ALL RESPONSES TO PHQ QUESTIONS 1-9: 0
SUM OF ALL RESPONSES TO PHQ QUESTIONS 1-9: 0
1. LITTLE INTEREST OR PLEASURE IN DOING THINGS: NOT AT ALL
SUM OF ALL RESPONSES TO PHQ9 QUESTIONS 1 & 2: 0
SUM OF ALL RESPONSES TO PHQ QUESTIONS 1-9: 0
2. FEELING DOWN, DEPRESSED OR HOPELESS: NOT AT ALL
SUM OF ALL RESPONSES TO PHQ QUESTIONS 1-9: 0

## 2024-08-19 NOTE — PROGRESS NOTES
Keith Perdue is a 33 y.o. female  Presenting for her annual checkup and follow-up. Lives with  and children. 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 PMH includes hyperlipidemia, GERD, vitamin D deficiency. Stopped taking vitamin D supplements about a month ago as she ran out of medications.     Last breast exam: 2023  Last PAP/pelvic: 2023  Last colonoscopy: n/a  Last DEXA: n/a  Health Maintenance   Topic Date Due    Hepatitis B vaccine (1 of 3 - 19+ 3-dose series) Never done    Flu vaccine (1) 2025 (Originally 2024)    COVID-19 Vaccine (4 - -24 season) 2025 (Originally 2023)    Depression Screen  2025    Cervical cancer screen  2026    DTaP/Tdap/Td vaccine (2 - Td or Tdap) 2028    Hepatitis A vaccine  Aged Out    Hib vaccine  Aged Out    HPV vaccine  Aged Out    Polio vaccine  Aged Out    Meningococcal (ACWY) vaccine  Aged Out    Pneumococcal 0-64 years Vaccine  Aged Out    Varicella vaccine  Discontinued    Hepatitis C screen  Discontinued    HIV screen  Discontinued       Exercise: moderately active  Diet: exercises regularly, nonsmoker, caffeine intake 1 cup of coffee three times per week, alcohol intake none    Vaccinations reviewed  Immunization History   Administered Date(s) Administered    COVID-19, MODERNA Booster BLUE border, (age 18y+), IM, 50mcg/0.25mL 2021, 2021, 2022       Allergies: Patient has no known allergies.  Current Outpatient Medications   Medication Sig    D 5000 125 MCG (5000 UT) CAPS capsule Take 1 capsule by mouth daily    SPRINTEC 28 0.25-35 MG-MCG per tablet Take 1 tablet by mouth daily    pantoprazole (PROTONIX) 40 MG tablet Take 1 tablet by mouth daily     No current facility-administered medications for this visit.      has a past medical history of Non-smoker.  Past Surgical History:   Procedure Laterality Date     SECTION      2 c-sections ; 2018    [place] UNLISTED PROCEDURE ABDOMEN PERITONEUM & OMENTUM

## 2024-08-27 DIAGNOSIS — E78.2 MIXED HYPERLIPIDEMIA: ICD-10-CM

## 2024-08-27 DIAGNOSIS — Z30.09 FAMILY PLANNING: ICD-10-CM

## 2024-08-27 DIAGNOSIS — E55.9 VITAMIN D DEFICIENCY: ICD-10-CM

## 2024-08-27 DIAGNOSIS — Z00.00 ANNUAL PHYSICAL EXAM: ICD-10-CM

## 2024-08-27 DIAGNOSIS — K21.9 GASTROESOPHAGEAL REFLUX DISEASE WITHOUT ESOPHAGITIS: ICD-10-CM

## 2024-08-27 LAB
25(OH)D3 SERPL-MCNC: 32.8 NG/ML (ref 30–100)
ALBUMIN SERPL-MCNC: 4.1 G/DL (ref 3.5–5)
ALBUMIN/GLOB SERPL: 1.1 (ref 1.1–2.2)
ALP SERPL-CCNC: 90 U/L (ref 45–117)
ALT SERPL-CCNC: 19 U/L (ref 12–78)
ANION GAP SERPL CALC-SCNC: 6 MMOL/L (ref 5–15)
AST SERPL-CCNC: 19 U/L (ref 15–37)
BILIRUB SERPL-MCNC: 0.4 MG/DL (ref 0.2–1)
BUN SERPL-MCNC: 12 MG/DL (ref 6–20)
BUN/CREAT SERPL: 12 (ref 12–20)
CALCIUM SERPL-MCNC: 9.1 MG/DL (ref 8.5–10.1)
CHLORIDE SERPL-SCNC: 106 MMOL/L (ref 97–108)
CHOLEST SERPL-MCNC: 211 MG/DL
CO2 SERPL-SCNC: 27 MMOL/L (ref 21–32)
COMMENT:: NORMAL
CREAT SERPL-MCNC: 1 MG/DL (ref 0.55–1.02)
ERYTHROCYTE [DISTWIDTH] IN BLOOD BY AUTOMATED COUNT: 12 % (ref 11.5–14.5)
EST. AVERAGE GLUCOSE BLD GHB EST-MCNC: 100 MG/DL
GLOBULIN SER CALC-MCNC: 3.6 G/DL (ref 2–4)
GLUCOSE SERPL-MCNC: 95 MG/DL (ref 65–100)
HBA1C MFR BLD: 5.1 % (ref 4–5.6)
HCG SERPL-ACNC: <1 MIU/ML (ref 0–6)
HCT VFR BLD AUTO: 43.9 % (ref 35–47)
HDLC SERPL-MCNC: 55 MG/DL
HDLC SERPL: 3.8 (ref 0–5)
HGB BLD-MCNC: 14.1 G/DL (ref 11.5–16)
LDLC SERPL CALC-MCNC: 117.8 MG/DL (ref 0–100)
MCH RBC QN AUTO: 29 PG (ref 26–34)
MCHC RBC AUTO-ENTMCNC: 32.1 G/DL (ref 30–36.5)
MCV RBC AUTO: 90.3 FL (ref 80–99)
NRBC # BLD: 0 K/UL (ref 0–0.01)
NRBC BLD-RTO: 0 PER 100 WBC
PLATELET # BLD AUTO: 293 K/UL (ref 150–400)
PMV BLD AUTO: 10.6 FL (ref 8.9–12.9)
POTASSIUM SERPL-SCNC: 3.8 MMOL/L (ref 3.5–5.1)
PROT SERPL-MCNC: 7.7 G/DL (ref 6.4–8.2)
RBC # BLD AUTO: 4.86 M/UL (ref 3.8–5.2)
SODIUM SERPL-SCNC: 139 MMOL/L (ref 136–145)
SPECIMEN HOLD: NORMAL
TRIGL SERPL-MCNC: 191 MG/DL
TSH SERPL DL<=0.05 MIU/L-ACNC: 1.03 UIU/ML (ref 0.36–3.74)
VLDLC SERPL CALC-MCNC: 38.2 MG/DL
WBC # BLD AUTO: 7.2 K/UL (ref 3.6–11)

## 2024-09-09 ENCOUNTER — TELEPHONE (OUTPATIENT)
Age: 34
End: 2024-09-09

## 2024-09-11 ENCOUNTER — OFFICE VISIT (OUTPATIENT)
Age: 34
End: 2024-09-11
Payer: COMMERCIAL

## 2024-09-11 VITALS
WEIGHT: 132.6 LBS | SYSTOLIC BLOOD PRESSURE: 90 MMHG | BODY MASS INDEX: 26.03 KG/M2 | TEMPERATURE: 98.3 F | RESPIRATION RATE: 16 BRPM | DIASTOLIC BLOOD PRESSURE: 56 MMHG | HEART RATE: 83 BPM | HEIGHT: 60 IN | OXYGEN SATURATION: 98 %

## 2024-09-11 DIAGNOSIS — R31.29 OTHER MICROSCOPIC HEMATURIA: ICD-10-CM

## 2024-09-11 DIAGNOSIS — K64.4 EXTERNAL HEMORRHOIDS: Primary | ICD-10-CM

## 2024-09-11 DIAGNOSIS — R35.0 URINARY FREQUENCY: ICD-10-CM

## 2024-09-11 LAB
BILIRUBIN, URINE, POC: NEGATIVE
BLOOD URINE, POC: NORMAL
GLUCOSE URINE, POC: NEGATIVE
HCG, PREGNANCY, URINE, POC: NEGATIVE
KETONES, URINE, POC: NEGATIVE
LEUKOCYTE ESTERASE, URINE, POC: NEGATIVE
NITRITE, URINE, POC: NEGATIVE
PH, URINE, POC: 7 (ref 4.6–8)
PROTEIN,URINE, POC: NEGATIVE
SPECIFIC GRAVITY, URINE, POC: 1.02 (ref 1–1.03)
URINALYSIS CLARITY, POC: CLEAR
URINALYSIS COLOR, POC: NORMAL
UROBILINOGEN, POC: NORMAL
VALID INTERNAL CONTROL, POC: YES

## 2024-09-11 PROCEDURE — 99213 OFFICE O/P EST LOW 20 MIN: CPT | Performed by: NURSE PRACTITIONER

## 2024-09-11 PROCEDURE — 81025 URINE PREGNANCY TEST: CPT | Performed by: NURSE PRACTITIONER

## 2024-09-11 PROCEDURE — 81002 URINALYSIS NONAUTO W/O SCOPE: CPT | Performed by: NURSE PRACTITIONER

## 2024-09-11 RX ORDER — HYDROCORTISONE ACETATE 25 MG/1
25 SUPPOSITORY RECTAL EVERY 12 HOURS
Qty: 24 SUPPOSITORY | Refills: 1 | Status: SHIPPED | OUTPATIENT
Start: 2024-09-11 | End: 2024-09-13

## 2024-09-11 RX ORDER — DOCUSATE SODIUM 100 MG/1
100 CAPSULE, LIQUID FILLED ORAL DAILY
Qty: 30 CAPSULE | Refills: 0 | Status: SHIPPED | OUTPATIENT
Start: 2024-09-11 | End: 2024-10-11

## 2024-09-11 ASSESSMENT — ENCOUNTER SYMPTOMS
VOMITING: 0
BLOOD IN STOOL: 0
SINUS PAIN: 0
RHINORRHEA: 0
EYE PAIN: 0
RESPIRATORY NEGATIVE: 1
DIARRHEA: 0
RECTAL PAIN: 1
CHEST TIGHTNESS: 0
NAUSEA: 0
EYES NEGATIVE: 1
SINUS PRESSURE: 0
ABDOMINAL PAIN: 0
CONSTIPATION: 0
SHORTNESS OF BREATH: 0
EYE REDNESS: 0
BACK PAIN: 0
COUGH: 0

## 2024-09-12 ENCOUNTER — TELEPHONE (OUTPATIENT)
Age: 34
End: 2024-09-12

## 2024-09-13 ENCOUNTER — TELEPHONE (OUTPATIENT)
Age: 34
End: 2024-09-13

## 2024-09-13 DIAGNOSIS — K64.4 EXTERNAL HEMORRHOIDS: Primary | ICD-10-CM

## 2024-09-13 RX ORDER — HYDROCORTISONE 25 MG/G
CREAM TOPICAL
Qty: 28 G | Refills: 1 | Status: SHIPPED | OUTPATIENT
Start: 2024-09-13

## 2024-09-30 ENCOUNTER — TELEPHONE (OUTPATIENT)
Age: 34
End: 2024-09-30

## 2024-09-30 NOTE — TELEPHONE ENCOUNTER
We received a PA request for pts rx: Anucort-HC 25MG suppositories. I tried to submit a PA to pts insurance but the information that is placed in cover my meds isn't correct. We need to verify insurance information.

## 2024-10-02 DIAGNOSIS — R31.29 OTHER MICROSCOPIC HEMATURIA: ICD-10-CM

## 2024-10-02 DIAGNOSIS — R35.0 URINARY FREQUENCY: ICD-10-CM

## 2024-10-02 LAB
APPEARANCE UR: ABNORMAL
BACTERIA URNS QL MICRO: ABNORMAL /HPF
BILIRUB UR QL: NEGATIVE
COLOR UR: ABNORMAL
EPITH CASTS URNS QL MICRO: ABNORMAL /LPF
GLUCOSE UR STRIP.AUTO-MCNC: NEGATIVE MG/DL
HGB UR QL STRIP: ABNORMAL
KETONES UR QL STRIP.AUTO: NEGATIVE MG/DL
LEUKOCYTE ESTERASE UR QL STRIP.AUTO: ABNORMAL
NITRITE UR QL STRIP.AUTO: POSITIVE
PH UR STRIP: 5.5 (ref 5–8)
PROT UR STRIP-MCNC: NEGATIVE MG/DL
RBC #/AREA URNS HPF: ABNORMAL /HPF (ref 0–5)
SP GR UR REFRACTOMETRY: 1.02 (ref 1–1.03)
URINE CULTURE IF INDICATED: ABNORMAL
UROBILINOGEN UR QL STRIP.AUTO: 0.2 EU/DL (ref 0.2–1)
WBC URNS QL MICRO: ABNORMAL /HPF (ref 0–4)

## 2024-10-03 DIAGNOSIS — R31.29 OTHER MICROSCOPIC HEMATURIA: Primary | ICD-10-CM

## 2024-12-01 NOTE — TELEPHONE ENCOUNTER
Opt out Response received from Cover my meds for Anusol-HC 2.5% cream is  denied.     Insurance response:     We have denied coverage for the services listed above for the following reasons: based on a review of our [employer] guideline. Your doctor asked [facility] to cover the drug, Anusol-HC cream. The health plan will not approve the request. You have diabetes. This is not a preferred drug for your condition with your health plan. The preferred drug is generic hydrocortisone perianal cream. You must try this drug first. Your doctor needs to send us a form detailing the adverse reactions to the generic drug. This form is the Food and Drug Administration (FDA) MedWatch Form. It must be filled out completely. Your doctor can also send specific and detailed chart notes showing the date/time, severity of your adverse reaction, the dose and amount of time you took the generic drug and any relevant tests or labs.   no

## 2025-08-01 DIAGNOSIS — Z00.00 ANNUAL PHYSICAL EXAM: ICD-10-CM

## 2025-08-01 DIAGNOSIS — Z30.09 FAMILY PLANNING: ICD-10-CM

## 2025-08-01 RX ORDER — NORGESTIMATE AND ETHINYL ESTRADIOL 0.25-0.035
1 KIT ORAL DAILY
Qty: 6 PACKET | Refills: 1 | Status: SHIPPED | OUTPATIENT
Start: 2025-08-01 | End: 2026-07-27